# Patient Record
Sex: FEMALE | Race: WHITE | NOT HISPANIC OR LATINO | URBAN - METROPOLITAN AREA
[De-identification: names, ages, dates, MRNs, and addresses within clinical notes are randomized per-mention and may not be internally consistent; named-entity substitution may affect disease eponyms.]

---

## 2019-12-12 ENCOUNTER — EMERGENCY (EMERGENCY)
Facility: HOSPITAL | Age: 34
LOS: 1 days | Discharge: HOME | End: 2019-12-12
Admitting: EMERGENCY MEDICINE
Payer: MEDICAID

## 2019-12-12 VITALS
RESPIRATION RATE: 16 BRPM | HEART RATE: 88 BPM | DIASTOLIC BLOOD PRESSURE: 111 MMHG | SYSTOLIC BLOOD PRESSURE: 171 MMHG | WEIGHT: 100.09 LBS | TEMPERATURE: 98 F | OXYGEN SATURATION: 99 % | HEIGHT: 65 IN

## 2019-12-12 VITALS
RESPIRATION RATE: 18 BRPM | SYSTOLIC BLOOD PRESSURE: 166 MMHG | DIASTOLIC BLOOD PRESSURE: 94 MMHG | OXYGEN SATURATION: 98 % | HEART RATE: 89 BPM

## 2019-12-12 PROCEDURE — 70450 CT HEAD/BRAIN W/O DYE: CPT | Mod: 26

## 2019-12-12 PROCEDURE — 99284 EMERGENCY DEPT VISIT MOD MDM: CPT

## 2019-12-12 NOTE — ED PROVIDER NOTE - CARE PROVIDER_API CALL
Sridevi Alonzo)  EEGEpilepsy; Neurology  [address], [address] [place] [address]  Phone: [phone]  Fax: [phone]  Follow Up Time:     Link Bahena)  Internal Medicine  [address] [place] [address]  Phone: [phone]  Fax: [phone]  Follow Up Time:

## 2019-12-12 NOTE — ED PROVIDER NOTE - PHYSICAL EXAMINATION
Vital Signs - nursing notes reviewed and confirmed  Gen - Malnourished appearing , NAD, comfortable and non-toxic appearing, speaking in full sentences   Skin - warm, dry, intact  HEENT - AT/NC, PERRL, EOMI, no conjunctival injection, moist oral mucosa, TM intact b/l with good cone of lights, o/p clear with no erythema, edema, or exudate, uvula midline, airway patent, neck supple and NT, FROM  CV - S1S2, R/R/R  Resp - respiration non-labored, CTAB, symmetric bs b/l, no r/r/w  GI - NABS, soft, ND, NT, no rebound or guarding, no CVAT b/l   MS - w/w/p, no c/c/e, calves supple and NT, distal pulses symmetric b/l, brisk cap refills, +SILT  Neuro - AxOx3, no focal neuro deficits, CN II-XII grossly intact, cerebellar function intact, negative pronator drift, negative nystagmus, ambulatory without gait disturbance

## 2019-12-12 NOTE — ED PROVIDER NOTE - CLINICAL SUMMARY MEDICAL DECISION MAKING FREE TEXT BOX
AFVSS at time of d/c, pt non-toxic appearing, results, ddx, and f/u plans discussed with pt at bedside, d/c'd home to f/u with PMD, strict return precautions discussed, prompt return to ER for any worsening or new sx, pt verbalized understanding. pt p/w persistent "feeing of foggy sensation and difficulty concentrating" s/p an episode of seizure like activities one wk ago, s/p labs, urine, utox, with unremarkable finding at [facility] today with no acute ICH, no other neuro sx, no focal neuro deficits on exam, well appearing and no relapse of sx since, AFVSS at time of d/c, pt non-toxic appearing, results, ddx, and f/u plans discussed with pt at bedside, d/c'd home to f/u with neuro, strict return precautions discussed, prompt return to ER for any worsening or new sx, pt verbalized understanding.

## 2019-12-12 NOTE — ED ADULT NURSE NOTE - CHPI ED NUR SYMPTOMS NEG
no loss of consciousness/no weakness/no blurred vision/no change in level of consciousness/no nausea/no numbness/no dizziness/no vomiting/no fever

## 2019-12-12 NOTE — ED PROVIDER NOTE - OBJECTIVE STATEMENT
33 yo F with PMHx of MDD on effexor, presenting c/o persistent "foggy sensation in my brain," s/p seizure activities one week ago 35 yo F with PMHx of MDD on effexor, presenting c/o persistent "foggy sensation in my brain," s/p seizure activities one week ago.  Pt reports having witnessed seizure like activities one week ago while sitting in the car with her boyfriend.  She was seen at [place] and s/p labs, EKG, Utox, and found to have marginally low glucose and dc home to f/u with PMD.  Pt reports no follow up thus far but still feeling foggy with difficulty concentrating. Denies HA, dizziness, numbness, tingling, photophobia, diplopia, change in vision/hearing/gait/mental status/speech, focal weakness, neck pain, rash, fever, chills, stiffness, CP, SOB, palpitations, diaphoresis, N/V/D/C, abdominal pain, change in urinary/bowel function, dysuria, hematuria, flank pain, and malaise. No change in meds and illicit drug use noted.  No family h/o seizure d/o or epilepsy noted

## 2019-12-12 NOTE — ED ADULT TRIAGE NOTE - CHIEF COMPLAINT QUOTE
states she had her 1st seizure 1 week ago, seen at er in [place], ct suggested but not done, since states she has been confused, sluggish

## 2019-12-12 NOTE — ED PROVIDER NOTE - PATIENT PORTAL LINK FT
You can access the FollowMyHealth Patient Portal offered by [facility] by registering at the following website: [email or web address]. By joining [employer]’s FollowMyHealth portal, you will also be able to view your health information using other applications (apps) compatible with our system.

## 2019-12-12 NOTE — ED ADULT NURSE NOTE - CHIEF COMPLAINT QUOTE
states she had her 1st seizure 1 week ago, seen at er in [place], ct suggested but not done, since states she has been confused, sluggish

## 2019-12-12 NOTE — ED ADULT NURSE NOTE - OBJECTIVE STATEMENT
Pt presents to ED c/o difficulty concentration, longer times for basic ADLs s/p new onset seizures x1 week ago. Pt presents to ED c/o difficulty concentration, longer times for basic ADLs s/p new onset seizures x1 week ago. Pt was seen in [facility], [place] with normal lab work, did not have head CT. Pt denies any headache or blurred vision. Speaking in full complete sentences, ambulating with steady gait without assistance.

## 2019-12-13 ENCOUNTER — INBOUND DOCUMENT (OUTPATIENT)
Age: 34
End: 2019-12-13

## 2019-12-13 PROBLEM — Z00.00 ENCOUNTER FOR PREVENTIVE HEALTH EXAMINATION: Status: ACTIVE | Noted: 2019-12-13

## 2019-12-21 DIAGNOSIS — R41.82 ALTERED MENTAL STATUS, UNSPECIFIED: ICD-10-CM
